# Patient Record
Sex: MALE | Race: WHITE | Employment: FULL TIME | URBAN - METROPOLITAN AREA
[De-identification: names, ages, dates, MRNs, and addresses within clinical notes are randomized per-mention and may not be internally consistent; named-entity substitution may affect disease eponyms.]

---

## 2021-12-03 NOTE — ED PROVIDER NOTES
Patient Seen in: [facility] AND [facility] Emergency Department      History   Patient presents with:   Eye Visual Problem    Stated Complaint: left eye visual problem    Subjective:   HPI    77-year-old male with no significant past medical history here after cu Chart Acuity: 20/20,    Left Eye Chart Acuity: 20/25,      Physical Exam  Vitals and nursing note reviewed. HENT:      Head: Normocephalic.       Mouth/Throat:      Mouth: Mucous membranes are moist.   Eyes:      Extraocular Movements: Extraocular movemen DEPARTMENT MEDICAL DECISION MAKING:  After obtaining the patient's history, performing the physical exam and reviewing the diagnostics, multiple initial diagnoses were considered based on the presenting problem including corneal abrasion, foreign body.

## 2021-12-03 NOTE — ED INITIAL ASSESSMENT (HPI)
Pt states he was cutting aluminum yesterday and he believes a small piece got in his left eye. Redness noted to left eye.

## 2024-06-17 ENCOUNTER — HOSPITAL ENCOUNTER (EMERGENCY)
Facility: HOSPITAL | Age: 44
Discharge: HOME | End: 2024-06-17
Attending: EMERGENCY MEDICINE

## 2024-06-17 VITALS
SYSTOLIC BLOOD PRESSURE: 132 MMHG | WEIGHT: 220 LBS | HEART RATE: 72 BPM | DIASTOLIC BLOOD PRESSURE: 84 MMHG | RESPIRATION RATE: 18 BRPM | OXYGEN SATURATION: 94 % | TEMPERATURE: 98 F

## 2024-06-17 DIAGNOSIS — S16.1XXA STRAIN OF NECK MUSCLE, INITIAL ENCOUNTER: Primary | ICD-10-CM

## 2024-06-17 DIAGNOSIS — S50.812A ABRASION OF LEFT FOREARM, INITIAL ENCOUNTER: ICD-10-CM
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
## 2024-06-17 LAB — GLUCOSE BLDC GLUCOMTR-MCNC: 106 MG/DL (ref 70–99)

## 2024-06-17 PROCEDURE — 99284 EMERGENCY DEPT VISIT MOD MDM: CPT

## 2024-06-17 PROCEDURE — 99283 EMERGENCY DEPT VISIT LOW MDM: CPT

## 2024-06-17 PROCEDURE — 82962 GLUCOSE BLOOD TEST: CPT

## 2024-06-17 RX ORDER — KETOROLAC TROMETHAMINE 10 MG/1
10 TABLET, FILM COATED ORAL EVERY 6 HOURS PRN
Qty: 20 TABLET | Refills: 0 | Status: SHIPPED | OUTPATIENT
Start: 2024-06-17 | End: 2024-06-24

## 2024-06-17 RX ORDER — CYCLOBENZAPRINE HCL 10 MG
10 TABLET ORAL 3 TIMES DAILY PRN
Qty: 20 TABLET | Refills: 0 | Status: SHIPPED | OUTPATIENT
Start: 2024-06-17 | End: 2024-06-24

## 2024-06-17 RX ORDER — IBUPROFEN 600 MG/1
600 TABLET ORAL ONCE
Status: COMPLETED | OUTPATIENT
Start: 2024-06-17 | End: 2024-06-17
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
